# Patient Record
Sex: FEMALE | URBAN - METROPOLITAN AREA
[De-identification: names, ages, dates, MRNs, and addresses within clinical notes are randomized per-mention and may not be internally consistent; named-entity substitution may affect disease eponyms.]

---

## 2024-07-29 ENCOUNTER — TELEPHONE (OUTPATIENT)
Dept: ENDOSCOPY | Facility: HOSPITAL | Age: 69
End: 2024-07-29
Payer: MEDICARE

## 2024-07-29 NOTE — TELEPHONE ENCOUNTER
----- Message from Zonia Theodore MA sent at 7/25/2024  5:02 PM CDT -----  Regarding: FW: appt  Is this general GI? Dr. Worthington?  ----- Message -----  From: Annmarie Sabillon  Sent: 7/24/2024  12:36 PM CDT  To: AISLINN Kumar Schedulers  Subject: appt                                             Dr LORENA Head is referring this pt to see you for small bowel enteroscopy  anemia  small intestine hemorrhage    Plz ctc pt to schedule appt    Referral/records in media     Thanks,  Annmarie Sabillon   [facility]

## 2024-07-29 NOTE — TELEPHONE ENCOUNTER
Dr. Worthington,  I received a referral from [facility] to schedule pt for procedure . Does the pt needs to see you in clinic ? And is this an AES case   Please advise

## 2024-08-05 ENCOUNTER — TELEPHONE (OUTPATIENT)
Dept: ENDOSCOPY | Facility: HOSPITAL | Age: 69
End: 2024-08-05
Payer: MEDICARE

## 2024-10-31 ENCOUNTER — TELEPHONE (OUTPATIENT)
Dept: GASTROENTEROLOGY | Facility: CLINIC | Age: 69
End: 2024-10-31
Payer: MEDICARE

## 2024-11-13 ENCOUNTER — OFFICE VISIT (OUTPATIENT)
Dept: GASTROENTEROLOGY | Facility: CLINIC | Age: 69
End: 2024-11-13
Payer: MEDICARE

## 2024-11-13 VITALS
BODY MASS INDEX: 44.12 KG/M2 | SYSTOLIC BLOOD PRESSURE: 144 MMHG | HEART RATE: 65 BPM | WEIGHT: 224.75 LBS | HEIGHT: 60 IN | DIASTOLIC BLOOD PRESSURE: 66 MMHG

## 2024-11-13 DIAGNOSIS — T39.395A NONSTEROIDAL ANTI-INFLAMMATORY DRUG (NSAID) INDUCED ENTEROPATHY: Primary | ICD-10-CM

## 2024-11-13 DIAGNOSIS — K63.9 NONSTEROIDAL ANTI-INFLAMMATORY DRUG (NSAID) INDUCED ENTEROPATHY: Primary | ICD-10-CM

## 2024-11-13 PROCEDURE — 99204 OFFICE O/P NEW MOD 45 MIN: CPT | Mod: S$GLB,,, | Performed by: INTERNAL MEDICINE

## 2024-11-13 PROCEDURE — 3288F FALL RISK ASSESSMENT DOCD: CPT | Mod: CPTII,S$GLB,, | Performed by: INTERNAL MEDICINE

## 2024-11-13 PROCEDURE — 1159F MED LIST DOCD IN RCRD: CPT | Mod: CPTII,S$GLB,, | Performed by: INTERNAL MEDICINE

## 2024-11-13 PROCEDURE — 1126F AMNT PAIN NOTED NONE PRSNT: CPT | Mod: CPTII,S$GLB,, | Performed by: INTERNAL MEDICINE

## 2024-11-13 PROCEDURE — 99999 PR PBB SHADOW E&M-EST. PATIENT-LVL III: CPT | Mod: PBBFAC,,, | Performed by: INTERNAL MEDICINE

## 2024-11-13 PROCEDURE — 1101F PT FALLS ASSESS-DOCD LE1/YR: CPT | Mod: CPTII,S$GLB,, | Performed by: INTERNAL MEDICINE

## 2024-11-13 PROCEDURE — 3077F SYST BP >= 140 MM HG: CPT | Mod: CPTII,S$GLB,, | Performed by: INTERNAL MEDICINE

## 2024-11-13 PROCEDURE — 3078F DIAST BP <80 MM HG: CPT | Mod: CPTII,S$GLB,, | Performed by: INTERNAL MEDICINE

## 2024-11-13 PROCEDURE — 3008F BODY MASS INDEX DOCD: CPT | Mod: CPTII,S$GLB,, | Performed by: INTERNAL MEDICINE

## 2024-11-13 PROCEDURE — 4010F ACE/ARB THERAPY RXD/TAKEN: CPT | Mod: CPTII,S$GLB,, | Performed by: INTERNAL MEDICINE

## 2024-11-13 RX ORDER — GABAPENTIN 300 MG/1
300 CAPSULE ORAL 3 TIMES DAILY
COMMUNITY

## 2024-11-13 RX ORDER — PANTOPRAZOLE SODIUM 40 MG/1
40 TABLET, DELAYED RELEASE ORAL DAILY
COMMUNITY

## 2024-11-13 RX ORDER — LEVOTHYROXINE SODIUM 112 UG/1
112 TABLET ORAL
COMMUNITY

## 2024-11-13 RX ORDER — MISOPROSTOL 200 UG/1
200 TABLET ORAL
Qty: 224 TABLET | Refills: 0 | Status: SHIPPED | OUTPATIENT
Start: 2024-11-13 | End: 2025-01-08

## 2024-11-13 RX ORDER — LISINOPRIL 10 MG/1
10 TABLET ORAL DAILY
COMMUNITY

## 2024-11-13 NOTE — PROGRESS NOTES
[facility]    CC: anemia, review VCE    HPI 69 y.o. female with history of recurrent diverticulitis s/p partial colectomy (~2021), arthritis with frequent NSAID use, who is referred for newly diagnosed MARIJA and to review VCE. EGD and colonoscopy earlier this year for indication of anemia workup. No history of anemia prior to this year, never received transfusion. No nose bleeds. She has received 4 doses of parenteral iron, most recently last month.    This year (2024) colon was normal, EGD with recurrent stricture that was repeat dilated. On PPI for GERD. VCE showed circumferential ulceration in the mid-distal small bowel.    She has no abdominal pain, overt GI bleeding or change in bowel habits. Takes ibuprofen 800mg most days for several years -- occasionally naproxen -- for back pain/arthritis.    Past Medical History  Recurrent diverticulitis s/p partial colectomy (~2021)  Arthritis with frequent NSAID use  GERD      Physical Examination  BP (!) 144/66 (BP Location: Right arm, Patient Position: Sitting)   Pulse 65   Ht 5' (1.524 m)   Wt 101.9 kg (224 lb 12.1 oz)   BMI 43.90 kg/m²   General appearance: alert, cooperative, no distress  Lungs: clear to auscultation bilaterally, no dullness to percussion bilaterally  Heart: regular rate and rhythm without rub; no displacement of the PMI   Abdomen: soft, non-tender; bowel sounds normoactive; no organomegaly    Lab Results   Component Value Date    WBC 10.6 (H) 08/05/2024    HGB 10.3 (L) 08/05/2024    HCT 34.2 (L) 08/05/2024    MCV 76 (L) 08/05/2024     08/05/2024     Lab Results   Component Value Date    FERRITIN 4.3 (L) 07/29/2024       VCE 7/2024: Large circumferential ulceration in the distal small bowel consistent with diaphragm lesion    I have personally reviewed and interpreted this test    Assessment: 69 y.o. female with history of recurrent diverticulitis s/p partial colectomy (~2021), arthritis with frequent NSAID use, who is referred  for newly diagnosed MARIJA and to review VCE. EGD and colonoscopy normal. Anemia is new dx this year, never required transfusion. On parenteral iron, most recently last month. 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 Prioritize Tylenol and multimodal pain control for arthritis  -Planning to establish with hematology close to home for anemia.  -Continue parenteral iron  -In the future if there is severe abdominal pain, development of small bowel stricture/obstruction, or if anemia severely worsens, next step would be CT enterography and evaluation for dilation vs resection    Miguel Salcedo MD  [employer] [facility] Fellow  Frankie Mistry MD   [address] [place] [address], [address]  [place] [address] [phone]

## 2024-11-18 NOTE — PATIENT INSTRUCTIONS
Misoprostol 200mcg 4x daily for 8 weeks  Avoid NSAIDs. Prioritize Tylenol and multimodal pain control for arthritis  Planning to establish with hematology close to home for anemia.  Continue parenteral iron